# Patient Record
Sex: MALE | Race: WHITE | NOT HISPANIC OR LATINO | Employment: UNEMPLOYED | URBAN - NONMETROPOLITAN AREA
[De-identification: names, ages, dates, MRNs, and addresses within clinical notes are randomized per-mention and may not be internally consistent; named-entity substitution may affect disease eponyms.]

---

## 2017-02-15 ENCOUNTER — LAB (OUTPATIENT)
Dept: LAB | Facility: HOSPITAL | Age: 62
End: 2017-02-15
Attending: UROLOGY

## 2017-02-15 DIAGNOSIS — C61 PROSTATE CANCER (HCC): Primary | ICD-10-CM

## 2017-02-15 DIAGNOSIS — C61 PROSTATE CANCER (HCC): ICD-10-CM

## 2017-02-15 LAB — PSA SERPL-MCNC: 0.07 NG/ML (ref 0–4)

## 2017-02-15 PROCEDURE — 84153 ASSAY OF PSA TOTAL: CPT | Performed by: UROLOGY

## 2017-02-15 PROCEDURE — 36415 COLL VENOUS BLD VENIPUNCTURE: CPT

## 2017-03-03 ENCOUNTER — HOSPITAL ENCOUNTER (OUTPATIENT)
Dept: HOSPITAL 79 - HEART 5 | Age: 62
End: 2017-03-03
Attending: INTERNAL MEDICINE
Payer: COMMERCIAL

## 2017-03-03 DIAGNOSIS — I25.10: Primary | ICD-10-CM

## 2017-03-15 ENCOUNTER — OFFICE VISIT (OUTPATIENT)
Dept: UROLOGY | Facility: CLINIC | Age: 62
End: 2017-03-15

## 2017-03-15 DIAGNOSIS — C61 PROSTATE CANCER (HCC): Primary | ICD-10-CM

## 2017-03-15 PROCEDURE — 99213 OFFICE O/P EST LOW 20 MIN: CPT | Performed by: UROLOGY

## 2017-03-15 NOTE — PROGRESS NOTES
Chief Complaint:          Chief Complaint   Patient presents with   • Prostate Cancer       HPI:   62 y.o. male.    HPI  Pt had a robotic prostatectomy last year in June.  His psa is 0.07 this is up slightly for 0.04.      Past Medical History:        Past Medical History   Diagnosis Date   • Acute sinusitis    • Arthritis    • CAD (coronary artery disease)    • Cardiac arrest    • Diabetes mellitus    • Hypertension    • Stroke          Current Meds:     Current Outpatient Prescriptions   Medication Sig Dispense Refill   • albuterol (PROVENTIL HFA) 108 (90 BASE) MCG/ACT inhaler Proventil  (90 Base) MCG/ACT Inhalation Aerosol Solution; Patient Sig: Proventil  (90 Base) MCG/ACT Inhalation Aerosol Solution ; 0; 11-Jul-2013; Active     • albuterol (PROVENTIL) (2.5 MG/3ML) 0.083% nebulizer solution Albuterol Sulfate (2.5 MG/3ML) 0.083% Inhalation Nebulization Solution; Patient Sig: Albuterol Sulfate (2.5 MG/3ML) 0.083% Inhalation Nebulization Solution ; 0; 29-Nov-2013; Active     • amLODIPine (NORVASC) 5 MG tablet      • amoxicillin-clavulanate (AUGMENTIN) 875-125 MG per tablet Take  by mouth.     • cetirizine (ZyrTEC) 10 MG tablet      • clopidogrel (PLAVIX) 75 MG tablet Take  by mouth.     • fluconazole (DIFLUCAN) 150 MG tablet Take  by mouth.     • FREESTYLE LITE test strip      • gabapentin (NEURONTIN) 800 MG tablet      • Hydrocodone-Acetaminophen (LORCET )  MG per tablet Take  by mouth.     • HYDROcodone-acetaminophen (NORCO)  MG per tablet      • ibuprofen (ADVIL,MOTRIN) 800 MG tablet Take  by mouth.     • Insulin Pen Needle (B-D UF III MINI PEN NEEDLES) 31G X 5 MM misc      • Liraglutide (VICTOZA) 18 MG/3ML solution pen-injector Inject  under the skin.     • lisinopril (PRINIVIL,ZESTRIL) 20 MG tablet Take  by mouth.     • lisinopril (PRINIVIL,ZESTRIL) 40 MG tablet      • metFORMIN (GLUCOPHAGE) 500 MG tablet Take  by mouth.     • metoprolol succinate XL (TOPROL-XL) 50 MG 24 hr  tablet Take  by mouth.     • metoprolol tartrate (LOPRESSOR) 50 MG tablet Take  by mouth.     • nystatin (MYCOSTATIN) 916982 UNIT/GM cream Apply  topically.     • pravastatin (PRAVACHOL) 20 MG tablet Take  by mouth.       No current facility-administered medications for this visit.         Allergies:      Allergies   Allergen Reactions   • Ciprofloxacin         Past Surgical History:     Past Surgical History   Procedure Laterality Date   • Cardiac surgery           Social History:     Social History     Social History   • Marital status:      Spouse name: N/A   • Number of children: N/A   • Years of education: N/A     Occupational History   • Not on file.     Social History Main Topics   • Smoking status: Never Smoker   • Smokeless tobacco: Never Used   • Alcohol use No   • Drug use: No   • Sexual activity: Not on file     Other Topics Concern   • Not on file     Social History Narrative       Family History:     Family History   Problem Relation Age of Onset   • Other Mother      emphysema   • Brain cancer Father        Review of Systems:     Review of Systems    Physical Exam:     Physical Exam   Constitutional: He is oriented to person, place, and time.   HENT:   Head: Normocephalic and atraumatic.   Right Ear: External ear normal.   Left Ear: External ear normal.   Nose: Nose normal.   Mouth/Throat: Oropharynx is clear and moist.   Eyes: Conjunctivae and EOM are normal. Pupils are equal, round, and reactive to light.   Neck: Normal range of motion. Neck supple. No thyromegaly present.   Cardiovascular: Normal rate, regular rhythm, normal heart sounds and intact distal pulses.    No murmur heard.  Pulmonary/Chest: Effort normal and breath sounds normal. No respiratory distress. He has no wheezes. He has no rales. He exhibits no tenderness.   Abdominal: Soft. Bowel sounds are normal. He exhibits no distension and no mass. There is no tenderness. No hernia.   Genitourinary: Rectum normal and penis normal.    Genitourinary Comments: Absent prostate   Musculoskeletal: Normal range of motion. He exhibits no edema or tenderness.   Lymphadenopathy:     He has no cervical adenopathy.   Neurological: He is alert and oriented to person, place, and time. No cranial nerve deficit. He exhibits normal muscle tone. Coordination normal.   Skin: Skin is warm. No rash noted.   Psychiatric: He has a normal mood and affect. His behavior is normal. Judgment and thought content normal.   Nursing note and vitals reviewed.      Procedure:     No notes on file      Assessment:     Encounter Diagnosis   Name Primary?   • Prostate cancer Yes     No orders of the defined types were placed in this encounter.      Plan:   Pt's  psa is slightly creeping up.  Will check it again in 6 months.    Counseling was given to patient for the following topics diagnostic results. and the interim medical history and current results were reviewed.  A treatment plan with follow-up was made. Total time of the encounter was 21 minutes and 21 minutes were spent discussing Prostate cancer [C61] face-to-face.        This document has been electronically signed by Miguel Suárez MD March 15, 2017 11:19 AM

## 2017-10-17 ENCOUNTER — OFFICE VISIT (OUTPATIENT)
Dept: UROLOGY | Facility: CLINIC | Age: 62
End: 2017-10-17

## 2017-10-17 VITALS — BODY MASS INDEX: 31.34 KG/M2 | WEIGHT: 195 LBS | HEIGHT: 66 IN

## 2017-10-17 DIAGNOSIS — C61 PROSTATE CANCER (HCC): Primary | ICD-10-CM

## 2017-10-17 PROCEDURE — 99213 OFFICE O/P EST LOW 20 MIN: CPT | Performed by: UROLOGY

## 2017-10-17 NOTE — PROGRESS NOTES
Chief Complaint:          Chief Complaint   Patient presents with   • Prostate Cancer     6 month f/u review labs       HPI:   62 y.o. male.    HPI  Pt had a robotic radical prostatectomy over 2 years ago and his psa done at [facility] is less than 0.1.because of insurance reasons.      Past Medical History:        Past Medical History:   Diagnosis Date   • Acute sinusitis    • Arthritis    • CAD (coronary artery disease)    • Cardiac arrest    • Diabetes mellitus    • Hypertension    • Stroke          Current Meds:     Current Outpatient Prescriptions   Medication Sig Dispense Refill   • albuterol (PROVENTIL HFA) 108 (90 BASE) MCG/ACT inhaler Proventil  (90 Base) MCG/ACT Inhalation Aerosol Solution; Patient Sig: Proventil  (90 Base) MCG/ACT Inhalation Aerosol Solution ; 0; 11-Jul-2013; Active     • albuterol (PROVENTIL) (2.5 MG/3ML) 0.083% nebulizer solution Albuterol Sulfate (2.5 MG/3ML) 0.083% Inhalation Nebulization Solution; Patient Sig: Albuterol Sulfate (2.5 MG/3ML) 0.083% Inhalation Nebulization Solution ; 0; 29-Nov-2013; Active     • amLODIPine (NORVASC) 5 MG tablet      • amoxicillin-clavulanate (AUGMENTIN) 875-125 MG per tablet Take  by mouth.     • cetirizine (ZyrTEC) 10 MG tablet      • clopidogrel (PLAVIX) 75 MG tablet Take  by mouth.     • fluconazole (DIFLUCAN) 150 MG tablet Take  by mouth.     • FREESTYLE LITE test strip      • gabapentin (NEURONTIN) 800 MG tablet      • Hydrocodone-Acetaminophen (LORCET )  MG per tablet Take  by mouth.     • HYDROcodone-acetaminophen (NORCO)  MG per tablet      • ibuprofen (ADVIL,MOTRIN) 800 MG tablet Take  by mouth.     • Insulin Pen Needle (B-D UF III MINI PEN NEEDLES) 31G X 5 MM misc      • Liraglutide (VICTOZA) 18 MG/3ML solution pen-injector Inject  under the skin.     • lisinopril (PRINIVIL,ZESTRIL) 20 MG tablet Take  by mouth.     • lisinopril (PRINIVIL,ZESTRIL) 40 MG tablet      • metFORMIN (GLUCOPHAGE) 500 MG tablet Take  by  mouth.     • metoprolol succinate XL (TOPROL-XL) 50 MG 24 hr tablet Take  by mouth.     • metoprolol tartrate (LOPRESSOR) 50 MG tablet Take  by mouth.     • nystatin (MYCOSTATIN) 158221 UNIT/GM cream Apply  topically.     • pravastatin (PRAVACHOL) 20 MG tablet Take  by mouth.       No current facility-administered medications for this visit.         Allergies:      Allergies   Allergen Reactions   • Ciprofloxacin         Past Surgical History:     Past Surgical History:   Procedure Laterality Date   • CARDIAC SURGERY           Social History:     Social History     Social History   • Marital status:      Spouse name: N/A   • Number of children: N/A   • Years of education: N/A     Occupational History   • Not on file.     Social History Main Topics   • Smoking status: Never Smoker   • Smokeless tobacco: Never Used   • Alcohol use No   • Drug use: No   • Sexual activity: Not on file     Other Topics Concern   • Not on file     Social History Narrative       Family History:     Family History   Problem Relation Age of Onset   • Other Mother      emphysema   • Brain cancer Father        Review of Systems:     Review of Systems    Physical Exam:     Physical Exam   Genitourinary:   Genitourinary Comments: Absent prostate.       Procedure:     No notes on file      Assessment:     Encounter Diagnosis   Name Primary?   • Prostate cancer Yes     No orders of the defined types were placed in this encounter.      Plan:   Will repeat his psa in 6 months.    Counseling was given to patient for the following topics diagnostic results. and the interim medical history and current results were reviewed.  A treatment plan with follow-up was made. Total time of the encounter was 17 minutes and 17 minutes were spent discussing Prostate cancer [C61] face-to-face.        This document has been electronically signed by Miguel Suárez MD October 17, 2017 2:01 PM

## 2018-04-10 ENCOUNTER — LAB (OUTPATIENT)
Dept: LAB | Facility: HOSPITAL | Age: 63
End: 2018-04-10
Attending: UROLOGY

## 2018-04-10 DIAGNOSIS — C61 PROSTATE CANCER (HCC): ICD-10-CM

## 2018-04-10 LAB — PSA SERPL-MCNC: 0.02 NG/ML (ref 0–4)

## 2018-04-10 PROCEDURE — 84153 ASSAY OF PSA TOTAL: CPT

## 2018-04-17 ENCOUNTER — OFFICE VISIT (OUTPATIENT)
Dept: UROLOGY | Facility: CLINIC | Age: 63
End: 2018-04-17

## 2018-04-17 VITALS — WEIGHT: 195.11 LBS | HEIGHT: 66 IN | BODY MASS INDEX: 31.36 KG/M2

## 2018-04-17 DIAGNOSIS — C61 PROSTATE CANCER (HCC): Primary | ICD-10-CM

## 2018-04-17 PROCEDURE — 99213 OFFICE O/P EST LOW 20 MIN: CPT | Performed by: UROLOGY

## 2018-04-17 NOTE — PROGRESS NOTES
Chief Complaint:          Prostate cancer    HPI:   63 y.o. male.    HPI  Pt had a robotic radical prostatectomy 3 years ago.  His psa is 0.02  Pt has good urine control.    Past Medical History:        Past Medical History:   Diagnosis Date   • Acute sinusitis    • Arthritis    • CAD (coronary artery disease)    • Cardiac arrest    • Diabetes mellitus    • Hypertension    • Stroke          Current Meds:     Current Outpatient Prescriptions   Medication Sig Dispense Refill   • albuterol (PROVENTIL HFA) 108 (90 BASE) MCG/ACT inhaler Proventil  (90 Base) MCG/ACT Inhalation Aerosol Solution; Patient Sig: Proventil  (90 Base) MCG/ACT Inhalation Aerosol Solution ; 0; 11-Jul-2013; Active     • albuterol (PROVENTIL) (2.5 MG/3ML) 0.083% nebulizer solution Albuterol Sulfate (2.5 MG/3ML) 0.083% Inhalation Nebulization Solution; Patient Sig: Albuterol Sulfate (2.5 MG/3ML) 0.083% Inhalation Nebulization Solution ; 0; 29-Nov-2013; Active     • amLODIPine (NORVASC) 5 MG tablet      • amoxicillin-clavulanate (AUGMENTIN) 875-125 MG per tablet Take  by mouth.     • cetirizine (ZyrTEC) 10 MG tablet      • clopidogrel (PLAVIX) 75 MG tablet Take  by mouth.     • fluconazole (DIFLUCAN) 150 MG tablet Take  by mouth.     • FREESTYLE LITE test strip      • gabapentin (NEURONTIN) 800 MG tablet      • Hydrocodone-Acetaminophen (LORCET )  MG per tablet Take  by mouth.     • HYDROcodone-acetaminophen (NORCO)  MG per tablet      • ibuprofen (ADVIL,MOTRIN) 800 MG tablet Take  by mouth.     • Insulin Pen Needle (B-D UF III MINI PEN NEEDLES) 31G X 5 MM misc      • Liraglutide (VICTOZA) 18 MG/3ML solution pen-injector Inject  under the skin.     • lisinopril (PRINIVIL,ZESTRIL) 20 MG tablet Take  by mouth.     • lisinopril (PRINIVIL,ZESTRIL) 40 MG tablet      • metFORMIN (GLUCOPHAGE) 500 MG tablet Take  by mouth.     • metoprolol succinate XL (TOPROL-XL) 50 MG 24 hr tablet Take  by mouth.     • metoprolol tartrate  (LOPRESSOR) 50 MG tablet Take  by mouth.     • nystatin (MYCOSTATIN) 781540 UNIT/GM cream Apply  topically.     • pravastatin (PRAVACHOL) 20 MG tablet Take  by mouth.       No current facility-administered medications for this visit.         Allergies:      Allergies   Allergen Reactions   • Ciprofloxacin         Past Surgical History:     Past Surgical History:   Procedure Laterality Date   • CARDIAC SURGERY           Social History:     Social History     Social History   • Marital status:      Spouse name: N/A   • Number of children: N/A   • Years of education: N/A     Occupational History   • Not on file.     Social History Main Topics   • Smoking status: Never Smoker   • Smokeless tobacco: Never Used   • Alcohol use No   • Drug use: No   • Sexual activity: Not on file     Other Topics Concern   • Not on file     Social History Narrative   • No narrative on file       Family History:     Family History   Problem Relation Age of Onset   • Other Mother      emphysema   • Brain cancer Father        Review of Systems:     Review of Systems   Constitutional: Negative for fatigue and fever.   HENT: Negative for congestion, ear pain and sore throat.    Respiratory: Negative for cough, chest tightness and shortness of breath.    Cardiovascular: Negative for chest pain and palpitations.   Gastrointestinal: Negative for constipation, nausea and vomiting.   Genitourinary: Negative for dysuria, hematuria, penile pain, penile swelling, scrotal swelling and testicular pain.   Neurological: Negative for dizziness, numbness and headaches.       IPSS Questionnaire (AUA-7):  Over the past month…    1)  How often have you had a sensation of not emptying your bladder completely after you finish urinating?  0 - Not at all   2)  How often have you had to urinate again less than two hours after you finished urinating? 1 - Less than 1 time in 5   3)  How often have you found you stopped and started again several times when you  urinated?  0 - Not at all   4) How difficult have you found it to postpone urination?  1 - Less than 1 time in 5   5) How often have you had a weak urinary stream?  0 - Not at all   6) How often have you had to push or strain to begin urination?  0 - Not at all   7) How many times did you most typically get up to urinate from the time you went to bed until the time you got up in the morning?  0 - None   Total Score:  2     I have reviewed the follow portions of the patient's history and confirmed they are accurate today:  allergies, current medications, past family history, past medical history, past social history, past surgical history, problem list and ROS  Physical Exam:     Physical Exam   Constitutional: He is oriented to person, place, and time.   HENT:   Head: Normocephalic and atraumatic.   Right Ear: External ear normal.   Left Ear: External ear normal.   Nose: Nose normal.   Mouth/Throat: Oropharynx is clear and moist.   Eyes: Conjunctivae and EOM are normal. Pupils are equal, round, and reactive to light.   Neck: Normal range of motion. Neck supple. No thyromegaly present.   Cardiovascular: Normal rate, regular rhythm, normal heart sounds and intact distal pulses.    No murmur heard.  Pulmonary/Chest: Effort normal and breath sounds normal. No respiratory distress. He has no wheezes. He has no rales. He exhibits no tenderness.   Abdominal: Soft. Bowel sounds are normal. He exhibits no distension and no mass. There is no tenderness. No hernia.   Genitourinary: Rectum normal and penis normal.   Genitourinary Comments: Prostate absent   Musculoskeletal: Normal range of motion. He exhibits no edema or tenderness.   Lymphadenopathy:     He has no cervical adenopathy.   Neurological: He is alert and oriented to person, place, and time. No cranial nerve deficit. He exhibits normal muscle tone. Coordination normal.   Skin: Skin is warm. No rash noted.   Psychiatric: He has a normal mood and affect. His behavior  "is normal. Judgment and thought content normal.   Nursing note and vitals reviewed.      Ht 167.6 cm (65.98\")   Wt 88.5 kg (195 lb 1.7 oz)   BMI 31.51 kg/m²    Procedure:         Assessment:     Encounter Diagnosis   Name Primary?   • Prostate cancer Yes     No orders of the defined types were placed in this encounter.      Plan:   Will see pt back in a year with psa prior    Patient's Body mass index is 31.51 kg/m². BMI is within normal parameters. No follow-up required.      Counseling was given to patient and family for the following topics diagnostic results including: psa. The interim medical history and current results were reviewed.  A treatment plan with follow-up was made for Prostate cancer [C61].  This document has been electronically signed by Miguel Suárez MD April 17, 2018 1:23 PM  "

## 2019-08-05 ENCOUNTER — TELEPHONE (OUTPATIENT)
Dept: UROLOGY | Facility: CLINIC | Age: 64
End: 2019-08-05

## 2023-11-15 ENCOUNTER — HOSPITAL ENCOUNTER (EMERGENCY)
Facility: HOSPITAL | Age: 68
Discharge: HOME | End: 2023-11-15
Attending: STUDENT IN AN ORGANIZED HEALTH CARE EDUCATION/TRAINING PROGRAM | Admitting: STUDENT IN AN ORGANIZED HEALTH CARE EDUCATION/TRAINING PROGRAM
Payer: MEDICARE

## 2023-11-15 ENCOUNTER — APPOINTMENT (OUTPATIENT)
Dept: CT IMAGING | Facility: HOSPITAL | Age: 68
End: 2023-11-15
Payer: MEDICARE

## 2023-11-15 VITALS
SYSTOLIC BLOOD PRESSURE: 156 MMHG | OXYGEN SATURATION: 97 % | WEIGHT: 195 LBS | HEIGHT: 68 IN | DIASTOLIC BLOOD PRESSURE: 92 MMHG | HEART RATE: 60 BPM | RESPIRATION RATE: 17 BRPM | TEMPERATURE: 97.5 F | BODY MASS INDEX: 29.55 KG/M2

## 2023-11-15 DIAGNOSIS — F29 PSYCHOSIS, UNSPECIFIED PSYCHOSIS TYPE: Primary | ICD-10-CM

## 2023-11-15 LAB
ALBUMIN SERPL-MCNC: 3.9 G/DL (ref 3.5–5.2)
ALBUMIN/GLOB SERPL: 1.5 G/DL
ALP SERPL-CCNC: 68 U/L (ref 39–117)
ALT SERPL W P-5'-P-CCNC: 13 U/L (ref 1–41)
ANION GAP SERPL CALCULATED.3IONS-SCNC: 9 MMOL/L (ref 5–15)
AST SERPL-CCNC: 17 U/L (ref 1–40)
BASOPHILS # BLD AUTO: 0.03 10*3/MM3 (ref 0–0.2)
BASOPHILS NFR BLD AUTO: 0.3 % (ref 0–1.5)
BILIRUB SERPL-MCNC: 0.8 MG/DL (ref 0–1.2)
BUN SERPL-MCNC: 20 MG/DL (ref 8–23)
BUN/CREAT SERPL: 14.1 (ref 7–25)
CALCIUM SPEC-SCNC: 9.7 MG/DL (ref 8.6–10.5)
CHLORIDE SERPL-SCNC: 106 MMOL/L (ref 98–107)
CO2 SERPL-SCNC: 29 MMOL/L (ref 22–29)
CREAT SERPL-MCNC: 1.42 MG/DL (ref 0.76–1.27)
DEPRECATED RDW RBC AUTO: 44.8 FL (ref 37–54)
EGFRCR SERPLBLD CKD-EPI 2021: 53.2 ML/MIN/1.73
EOSINOPHIL # BLD AUTO: 0.07 10*3/MM3 (ref 0–0.4)
EOSINOPHIL NFR BLD AUTO: 0.8 % (ref 0.3–6.2)
ERYTHROCYTE [DISTWIDTH] IN BLOOD BY AUTOMATED COUNT: 13.7 % (ref 12.3–15.4)
ETHANOL BLD-MCNC: <10 MG/DL (ref 0–10)
ETHANOL UR QL: <0.01 %
GLOBULIN UR ELPH-MCNC: 2.6 GM/DL
GLUCOSE SERPL-MCNC: 113 MG/DL (ref 65–99)
HCT VFR BLD AUTO: 49.9 % (ref 37.5–51)
HGB BLD-MCNC: 16 G/DL (ref 13–17.7)
IMM GRANULOCYTES # BLD AUTO: 0.02 10*3/MM3 (ref 0–0.05)
IMM GRANULOCYTES NFR BLD AUTO: 0.2 % (ref 0–0.5)
LYMPHOCYTES # BLD AUTO: 3.15 10*3/MM3 (ref 0.7–3.1)
LYMPHOCYTES NFR BLD AUTO: 34.4 % (ref 19.6–45.3)
MAGNESIUM SERPL-MCNC: 2.2 MG/DL (ref 1.6–2.4)
MCH RBC QN AUTO: 28.8 PG (ref 26.6–33)
MCHC RBC AUTO-ENTMCNC: 32.1 G/DL (ref 31.5–35.7)
MCV RBC AUTO: 89.9 FL (ref 79–97)
MONOCYTES # BLD AUTO: 0.65 10*3/MM3 (ref 0.1–0.9)
MONOCYTES NFR BLD AUTO: 7.1 % (ref 5–12)
NEUTROPHILS NFR BLD AUTO: 5.24 10*3/MM3 (ref 1.7–7)
NEUTROPHILS NFR BLD AUTO: 57.2 % (ref 42.7–76)
NRBC BLD AUTO-RTO: 0 /100 WBC (ref 0–0.2)
PLATELET # BLD AUTO: 128 10*3/MM3 (ref 140–450)
PMV BLD AUTO: 11.2 FL (ref 6–12)
POTASSIUM SERPL-SCNC: 4 MMOL/L (ref 3.5–5.2)
PROT SERPL-MCNC: 6.5 G/DL (ref 6–8.5)
RBC # BLD AUTO: 5.55 10*6/MM3 (ref 4.14–5.8)
SODIUM SERPL-SCNC: 144 MMOL/L (ref 136–145)
WBC NRBC COR # BLD: 9.16 10*3/MM3 (ref 3.4–10.8)

## 2023-11-15 PROCEDURE — 99285 EMERGENCY DEPT VISIT HI MDM: CPT

## 2023-11-15 PROCEDURE — 83735 ASSAY OF MAGNESIUM: CPT | Performed by: PHYSICIAN ASSISTANT

## 2023-11-15 PROCEDURE — 82077 ASSAY SPEC XCP UR&BREATH IA: CPT | Performed by: PHYSICIAN ASSISTANT

## 2023-11-15 PROCEDURE — 85025 COMPLETE CBC W/AUTO DIFF WBC: CPT | Performed by: PHYSICIAN ASSISTANT

## 2023-11-15 PROCEDURE — 70450 CT HEAD/BRAIN W/O DYE: CPT

## 2023-11-15 PROCEDURE — 80053 COMPREHEN METABOLIC PANEL: CPT | Performed by: PHYSICIAN ASSISTANT

## 2023-11-15 PROCEDURE — 70450 CT HEAD/BRAIN W/O DYE: CPT | Performed by: RADIOLOGY

## 2023-11-15 PROCEDURE — 36415 COLL VENOUS BLD VENIPUNCTURE: CPT

## 2023-11-15 RX ORDER — HYDROXYZINE PAMOATE 50 MG/1
50 CAPSULE ORAL NIGHTLY PRN
Qty: 15 CAPSULE | Refills: 0 | Status: SHIPPED | OUTPATIENT
Start: 2023-11-15
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
